# Patient Record
Sex: MALE | Race: WHITE
[De-identification: names, ages, dates, MRNs, and addresses within clinical notes are randomized per-mention and may not be internally consistent; named-entity substitution may affect disease eponyms.]

---

## 2018-08-27 ENCOUNTER — HOSPITAL ENCOUNTER (OUTPATIENT)
Dept: HOSPITAL 74 - FASU | Age: 38
Discharge: HOME | End: 2018-08-27
Attending: ORTHOPAEDIC SURGERY
Payer: COMMERCIAL

## 2018-08-27 VITALS — BODY MASS INDEX: 24.6 KG/M2

## 2018-08-27 VITALS — SYSTOLIC BLOOD PRESSURE: 116 MMHG | DIASTOLIC BLOOD PRESSURE: 69 MMHG | HEART RATE: 72 BPM

## 2018-08-27 VITALS — TEMPERATURE: 96 F

## 2018-08-27 DIAGNOSIS — M48.061: Primary | ICD-10-CM

## 2018-08-27 PROCEDURE — 01NB0ZZ RELEASE LUMBAR NERVE, OPEN APPROACH: ICD-10-PCS | Performed by: ORTHOPAEDIC SURGERY

## 2018-08-27 NOTE — OP
Operative Note





- Note:


Operative Date: 08/27/18


Pre-Operative Diagnosis: lumbar stenosis


Operation: laminecotmy of L4-L5


Surgeon: Isrrael Niño


Assistant: Sary Tomlinson


Anesthesiologist/CRNA: Jonh Olmos


Anesthesia: Spinal


Estimated Blood Loss (mls): 30


Fluid Volume Replaced (mls): 900


Operative Report Dictated: Yes

## 2018-08-27 NOTE — HP
History & Physical Update





- History


History: No Change





- Physical


Physical: No Change





- Assessment


Assessment: No Change





- Plan


Plan: No Change (full H&P in chart from 8/17/2018)

## 2018-08-27 NOTE — OP
DATE OF OPERATION:  08/27/2018

 

PREOPERATIVE DIAGNOSIS:  L4-5 spinal stenosis.

 

POSTOPERATIVE DIAGNOSIS:  L4-5 spinal stenosis.

 

PROCEDURE PERFORMED:  L4-5 laminectomy.

 

SURGEON:  Isrrael Niño MD

 

ASSISTANT:  MALIKA Ashley

 

ESTIMATED BLOOD LOSS:  50 mL

 

INTRAVENOUS FLUIDS:  Per Anesthesia.

 

ANESTHESIA:  Spinal/TLIP.

 

COMPLICATIONS:  None.

 

DISPOSITION:  Patient brought to the PACU in stable condition.

 

INDICATIONS FOR SURGERY:  The patient is a 38-year-old gentleman who has been

suffering from pain from his back down his legs.  X-rays and MRI were completed which

noted that he had spinal stenosis at L4-5.  He had gone through an exhaustive course

of treatment for this, which included medications, physical therapy as well as

injections.  Unfortunately, his pain continued to persist despite all this.  At this

point, risks, benefits, and alternatives were discussed, and the patient consented to

surgery.

 

DESCRIPTION OF PROCEDURE:  Patient was brought to the operating room by the

anesthesia staff.  After appropriate patient identification was performed, spinal

anesthesia was given.  A TLIP block was also given.  Patient was able to position

himself prone onto the OR table with all areas of bony prominences well padded at

this time.  Two needles were placed into his back to gracia off the L4-5 segment. 

X-ray was taken to confirm this as correct.  Needles were removed, and 10 mL of

lidocaine with epinephrine were injected in his back at this time.  His back was

prepped and draped in a sterile manner.

 

At this point, a timeout was completed.  An incision was made from the top of L4 down

to the bottom of L5.  Dissection was carried down to the fascia.  Fascia was split

open at this time, and the appropriate retractors were then placed in.  A spinal

needle was placed onto the L4 lamina to gracia off the L4-5 level.  X-ray was taken to

confirm this as correct.  The needle was removed.  The microscope was brought in. 

The interspinous ligament at L4-5 was removed.  Portions of the L4 and L5 spinous

processes were removed.  Portions of the L4 and L5 lamina were removed.  The flavum

was identified, was removed.  A complete decompression was performed such that by the

end of the procedure the L5 nerve root appeared to be well decompressed.

 

All bleeding was well controlled at this time.  Steroid was placed over the nerve

root.  FloSeal was placed over that.  The fascia was closed with a No. 1 Vicryl

suture.   Subcutaneous tissues were closed with 2-0 Vicryl suture.  Skin was closed

with 3-0 Monocryl suture.  Dermabond was applied.  Steri-Strips were applied.  A

sterile dressing was applied.  Patient was placed supine on the OR bed and brought to

the PACU in stable condition.

 

 

OXANA BENTON/[id number]

DD: 08/27/2018 10:11

DT: 08/27/2018 19:48

Job #:  [id number]

## 2018-08-27 NOTE — SURG
Surgery First Assist Note


First Assist: Sary Tomlinson PA-C


Date of Service: 08/27/18


Diagnosis: 





lumbar stenosis


Procedure: 





laminectomy of L4-L5


I was present for the entirety of the operative procedure. For further detail, 

please refer to operative report.








Visit type





- Case Type


Case Type: Scheduled





- Emergency


Emergency Visit: No





- New patient


This patient is new to me today: Yes


Date on this admission: 08/27/18